# Patient Record
Sex: FEMALE | Race: WHITE | NOT HISPANIC OR LATINO | URBAN - METROPOLITAN AREA
[De-identification: names, ages, dates, MRNs, and addresses within clinical notes are randomized per-mention and may not be internally consistent; named-entity substitution may affect disease eponyms.]

---

## 2022-09-16 ENCOUNTER — MEDICAL CORRESPONDENCE (OUTPATIENT)
Dept: HEALTH INFORMATION MANAGEMENT | Facility: CLINIC | Age: 77
End: 2022-09-16

## 2022-09-23 ENCOUNTER — TRANSCRIBE ORDERS (OUTPATIENT)
Dept: OTHER | Age: 77
End: 2022-09-23

## 2022-09-23 DIAGNOSIS — R10.2 VULVAR PAIN: Primary | ICD-10-CM

## 2022-09-29 ENCOUNTER — TELEPHONE (OUTPATIENT)
Dept: OBGYN | Facility: CLINIC | Age: 77
End: 2022-09-29

## 2022-09-29 NOTE — TELEPHONE ENCOUNTER
Received referral for vulvar pain from [facility].    Tried to reach Wen, but received voicemail.  Left message to call back.    Time saved at 200 pm on 10/5.    Records in  triage

## 2022-10-04 ENCOUNTER — OFFICE VISIT (OUTPATIENT)
Dept: OBGYN | Facility: CLINIC | Age: 77
End: 2022-10-04
Attending: OBSTETRICS & GYNECOLOGY
Payer: COMMERCIAL

## 2022-10-04 VITALS — WEIGHT: 140 LBS

## 2022-10-04 DIAGNOSIS — R10.2 VULVAR PAIN: ICD-10-CM

## 2022-10-04 DIAGNOSIS — R10.2 PELVIC PAIN IN FEMALE: Primary | ICD-10-CM

## 2022-10-04 PROCEDURE — 88305 TISSUE EXAM BY PATHOLOGIST: CPT | Mod: 26 | Performed by: DERMATOLOGY

## 2022-10-04 PROCEDURE — 56606 BIOPSY OF VULVA/PERINEUM: CPT | Mod: GC | Performed by: STUDENT IN AN ORGANIZED HEALTH CARE EDUCATION/TRAINING PROGRAM

## 2022-10-04 PROCEDURE — 56606 BIOPSY OF VULVA/PERINEUM: CPT | Performed by: STUDENT IN AN ORGANIZED HEALTH CARE EDUCATION/TRAINING PROGRAM

## 2022-10-04 PROCEDURE — 56605 BIOPSY OF VULVA/PERINEUM: CPT | Mod: GC | Performed by: STUDENT IN AN ORGANIZED HEALTH CARE EDUCATION/TRAINING PROGRAM

## 2022-10-04 PROCEDURE — 56605 BIOPSY OF VULVA/PERINEUM: CPT | Performed by: STUDENT IN AN ORGANIZED HEALTH CARE EDUCATION/TRAINING PROGRAM

## 2022-10-04 PROCEDURE — G0463 HOSPITAL OUTPT CLINIC VISIT: HCPCS

## 2022-10-04 PROCEDURE — 99203 OFFICE O/P NEW LOW 30 MIN: CPT | Mod: 25 | Performed by: STUDENT IN AN ORGANIZED HEALTH CARE EDUCATION/TRAINING PROGRAM

## 2022-10-04 PROCEDURE — 88305 TISSUE EXAM BY PATHOLOGIST: CPT | Mod: TC | Performed by: STUDENT IN AN ORGANIZED HEALTH CARE EDUCATION/TRAINING PROGRAM

## 2022-10-04 RX ORDER — CYANOCOBALAMIN 1000 UG/ML
1000 INJECTION, SOLUTION INTRAMUSCULAR; SUBCUTANEOUS
COMMUNITY
Start: 2022-09-26 | End: 2023-10-21

## 2022-10-04 RX ORDER — PREGABALIN 50 MG/1
CAPSULE ORAL
COMMUNITY
Start: 2022-09-06

## 2022-10-04 RX ORDER — CYCLOSPORINE 0.5 MG/ML
1 EMULSION OPHTHALMIC
COMMUNITY

## 2022-10-04 RX ORDER — ASPIRIN 325 MG
325 TABLET ORAL
COMMUNITY
Start: 2021-09-30

## 2022-10-04 RX ORDER — HYPROMELLOSE 0 G/G
1-2 GEL OPHTHALMIC
COMMUNITY

## 2022-10-04 RX ORDER — LEVOTHYROXINE SODIUM 125 UG/1
125 TABLET ORAL
COMMUNITY
Start: 2022-08-05 | End: 2023-08-05

## 2022-10-04 RX ORDER — ARIPIPRAZOLE 5 MG/1
7.5 TABLET ORAL
COMMUNITY
Start: 2022-09-19 | End: 2022-12-18

## 2022-10-04 RX ORDER — FLUOCINOLONE ACETONIDE 0.11 MG/ML
OIL TOPICAL
COMMUNITY
Start: 2022-05-17

## 2022-10-04 RX ORDER — CLOBETASOL PROPIONATE 0.5 MG/G
CREAM TOPICAL
COMMUNITY
Start: 2022-08-25

## 2022-10-04 NOTE — PROGRESS NOTES
"[facility]  Gynecology Visit   10/04/2022    Chief Complaint: Vulvar pain    History of Present Illness:  Wen Styles is a 77 year old  who presents for consult regarding pelvic and vulvovaginal pain. She expresses frustration that she has seen 6 prior physicians about this without improvement.    Reports that the pelvic pain began suddenly and felt like a \"stump exploded inside\" of her and like she broke her pelvic bone approximately 8 months ago. She reports that the pain has occurred intermittently and lasting 5 days during the last episode and 2 days during the previous episode before that. Pain is worse when sitting, feels like pressure at though she is \"sitting on a brick\". and that it feels like someone is \"reaching up and pulling her guts out from inside of her\". She reports that applying a heating pack helped. She reports that it was more painful during those episodes than a broken bone and made it very difficult for her to walk at all. She additionally reports right sided abdominal pain that feels like a bad gas pain and bloating occassionally. Denies dysuria. Endorse pain with bowel movements at times. Denies vaginal bleeding, rectal bleeding. Reports occasional vaginal odor which resolves spontaneously.     She additional reports vulvar pain intermittently as well as severe vulvar pruritis. She has not been using any different topical products. She has not been applying anything to the vulva currently. She reports that she used clobetasol cream for 4 weeks daily and then every other day for 4 more weeks without improvement for two courses now.     Gynecologic History:  - Menopause: Reached at 38-40. No PMB. No HRT. Reports long history of painful periods prior to menopause.   - Pap Smears: Uncertain of last   - Sexual Activity: not currently sexually active, has not been sexually active since the 1970s  - Sexual Concerns: denies any concerns, no dyspareunia when last sexually active   - Hx " STIs: Denies    Obstetric History: , prolonged infertility     ROS:  A 10 point review of systems was conducted and negative except as noted in HPI    Current Medications:   Current Outpatient Medications:      ARIPiprazole (ABILIFY) 5 MG tablet, Take 7.5 mg by mouth, Disp: , Rfl:      aspirin (ASA) 325 MG tablet, Take 325 mg by mouth, Disp: , Rfl:      calcium citrate-vitamin D (CITRACAL) 315-250 MG-UNIT TABS per tablet, 2 tablets three times a day.  OTC, Disp: , Rfl:      cholecalciferol 125 MCG (5000 UT) CAPS, Take 5,000 Units by mouth, Disp: , Rfl:      clobetasol (TEMOVATE) 0.05 % external cream, Apply to affected area once a day for 1 month then twice a week for 2 months., Disp: , Rfl:      cyanocobalamin (CYANOCOBALAMIN) 1000 MCG/ML injection, Inject 1,000 mcg into the muscle every 30 days, Disp: , Rfl:      fluocinolone acetonide (DERMA SMOOTHE/FS BODY) 0.01 % external oil, , Disp: , Rfl:      levothyroxine (SYNTHROID) 125 MCG tablet, Take 125 mcg by mouth, Disp: , Rfl:      pregabalin (LYRICA) 50 MG capsule, TAKE 1 CAPSULE BY MOUTH 3 TIMES DAILY IN THE MORNING, NOON AND BEFORE BEDTIME, Disp: , Rfl:      cycloSPORINE (RESTASIS) 0.05 % ophthalmic emulsion, Apply 1 drop to eye, Disp: , Rfl:      hypromellose (GENTEAL SEVERE) 0.3 % GEL ophthalmic gel, Apply 1-2 drops to eye, Disp: , Rfl:      magnesium sulfate 500 mg/mL SOLN, Take 250 mg by mouth, Disp: , Rfl:     Allergies:      Allergies   Allergen Reactions     Iohexol Hives     Pt developed one hive after contrast CT scan.  Symptoms subsided within 10 minutes.  Dr Carranza notified and pt allowed to leave after 30 minutes with instructions to go straight home and go to ETC if breathing problems develop.  Pt also instructed to take benadryl at home if needed.     No Clinical Screening - See Comments Hives, Itching, Rash, Shortness Of Breath and Anaphylaxis     Grass, weeds  Ate a nectarine at home and a short time later had anaphylaxis type symptoms  "responding to emergency treatment.       Nulytely Amaral Hives     Facial swelling, hives, nausea     Cherry Itching     Hydroxychloroquine Other (See Comments)     Retinal Toxicity     Morphine Nausea and Vomiting     Pramipexole Other (See Comments)     Severe drop attacks     Ropinirole Nausea and Vomiting     Zoledronic Acid Other (See Comments)     Bone pain     Adhesive Tape Other (See Comments)     Hard to get tape off, blood blister, itching, Skin tears and bruising     Past Medical History:  Pernicious anemia  Hypothyroidism  Restless leg syndrome  Myofascial pain syndrome  Hyperparathyroidism  Vitamin D deficiency  Obstructive sleep apnea  Irribtable bowel syndrome  Chronic back pain  Osteoarthritis  Osteoprosis  DAYO  Depression  Psoriasis  Ischemic stroke  Inflammatory arthritis     Past Surgical History:  Open gastric bypass  Multiple orthopedic procedures     Social History:  Lives in [place] with her dog   EtOH: 1 glass of wine nightly   Tobacco: Denies use, former smoker  Drugs: Denies illicit drug use  Abuse: Denies current concerns for physical, sexual, mental, emotional, or verbal abuse.    Family History:  Bother- \"bone cancer\"  Brother- \"brain cancer\"  Brother- \"pituitary gland cancer\"  Maternal grandmother- ovarian or uterine cancer around menopause   Twelve paternal uncles/aunts  of cancer (unspecified types)  Maternal uncle-  of cancer (unspecififed type)    Denies family history of breast or colorectal cancer.     Exam:  Wt 63.5 kg (140 lb)     General:  Alert, no distress   HEENT:  Normocephalic, without obvious abnormality   Pulmonary:  Non-labored breathing on room air   Cardiovascular:  Regular rate   Abdomen:  Soft, mildly tender in right upper quadrant, non-distended   Pelvic: Vulva: Normal vulvar architecture. Small sebaceous cyst on the left vulvar biopsied. Small area of lichenoid changes at the posterior fourchette at 6 o'clock biopsied.    Vagina: Mildly atrophic, " physiologic appearing discharge  Cervix: nulliparous, smooth, no visible lesions, no cervical motion tenderness   Extremities:  Normal       Assessment/Plan:  77 year old  with pelvic and vulvar pain and vulvar pruritis     # Pelvic pain   -  No reproducible pain or tenderness on abdominal exam or with palpation of the pubic bone. Will obtain pelvic MRI to assess for possible etiology of pain.     # Possible vulvar dermatosis   - Vulvar biopsy x2 obtained today. Will await pathology results and arrange follow up based on results. Overall appears to have had good response to previous course of clobetasol treatments based on review of prior notes which suggest more extensive evidence of vulvar dermatosis. Plan to observe without ongoing topical treatment at this time but recommend repeat exam in 3 months or sooner is symptoms worsen.     Discussed with Dr. Shine Grande MD  Ob/Gyn Resident, PGY-4  10/4/2022

## 2022-10-04 NOTE — LETTER
"10/4/2022       RE: Wen Styles  [address]  [facility] [id number]     Dear Colleague,    Thank you for referring your patient, Wen Styles, to the [facility] [place] at [facility]. Please see a copy of my visit note below.    [facility]  Gynecology Visit   10/04/2022    Chief Complaint: Vulvar pain    History of Present Illness:  Wen Styles is a 77 year old  who presents for consult regarding pelvic and vulvovaginal pain. She expresses frustration that she has seen 6 prior physicians about this without improvement.    Reports that the pelvic pain began suddenly and felt like a \"stump exploded inside\" of her and like she broke her pelvic bone approximately 8 months ago. She reports that the pain has occurred intermittently and lasting 5 days during the last episode and 2 days during the previous episode before that. Pain is worse when sitting, feels like pressure at though she is \"sitting on a brick\". and that it feels like someone is \"reaching up and pulling her guts out from inside of her\". She reports that applying a heating pack helped. She reports that it was more painful during those episodes than a broken bone and made it very difficult for her to walk at all. She additionally reports right sided abdominal pain that feels like a bad gas pain and bloating occassionally. Denies dysuria. Endorse pain with bowel movements at times. Denies vaginal bleeding, rectal bleeding. Reports occasional vaginal odor which resolves spontaneously.     She additional reports vulvar pain intermittently as well as severe vulvar pruritis. She has not been using any different topical products. She has not been applying anything to the vulva currently. She reports that she used clobetasol cream for 4 weeks daily and then every other day for 4 more weeks without improvement for two courses now.     Gynecologic History:  - Menopause: " Reached at [age]. No PMB. No HRT. Reports long history of painful periods prior to menopause.   - Pap Smears: Uncertain of last   - Sexual Activity: not currently sexually active, has not been sexually active since the 1970s  - Sexual Concerns: denies any concerns, no dyspareunia when last sexually active   - Hx STIs: Denies    Obstetric History: , prolonged infertility     ROS:  A 10 point review of systems was conducted and negative except as noted in HPI    Current Medications:   Current Outpatient Medications:      ARIPiprazole (ABILIFY) 5 MG tablet, Take 7.5 mg by mouth, Disp: , Rfl:      aspirin (ASA) 325 MG tablet, Take 325 mg by mouth, Disp: , Rfl:      calcium citrate-vitamin D (CITRACAL) 315-250 MG-UNIT TABS per tablet, 2 tablets three times a day.  OTC, Disp: , Rfl:      cholecalciferol 125 MCG (5000 UT) CAPS, Take 5,000 Units by mouth, Disp: , Rfl:      clobetasol (TEMOVATE) 0.05 % external cream, Apply to affected area once a day for 1 month then twice a week for 2 months., Disp: , Rfl:      cyanocobalamin (CYANOCOBALAMIN) 1000 MCG/ML injection, Inject 1,000 mcg into the muscle every 30 days, Disp: , Rfl:      fluocinolone acetonide (DERMA SMOOTHE/FS BODY) 0.01 % external oil, , Disp: , Rfl:      levothyroxine (SYNTHROID) 125 MCG tablet, Take 125 mcg by mouth, Disp: , Rfl:      pregabalin (LYRICA) 50 MG capsule, TAKE 1 CAPSULE BY MOUTH 3 TIMES DAILY IN THE MORNING, NOON AND BEFORE BEDTIME, Disp: , Rfl:      cycloSPORINE (RESTASIS) 0.05 % ophthalmic emulsion, Apply 1 drop to eye, Disp: , Rfl:      hypromellose (GENTEAL SEVERE) 0.3 % GEL ophthalmic gel, Apply 1-2 drops to eye, Disp: , Rfl:      magnesium sulfate 500 mg/mL SOLN, Take 250 mg by mouth, Disp: , Rfl:     Allergies:      Allergies   Allergen Reactions     Iohexol Hives     Pt developed one hive after contrast CT scan.  Symptoms subsided within 10 minutes.  Dr Carranza notified and pt allowed to leave after 30 minutes with instructions to go  "straight home and go to ETC if breathing problems develop.  Pt also instructed to take benadryl at home if needed.     No Clinical Screening - See Comments Hives, Itching, Rash, Shortness Of Breath and Anaphylaxis     Grass, weeds  Ate a nectarine at home and a short time later had anaphylaxis type symptoms responding to emergency treatment.       Nulytely Amaral Hives     Facial swelling, hives, nausea     Cherry Itching     Hydroxychloroquine Other (See Comments)     Retinal Toxicity     Morphine Nausea and Vomiting     Pramipexole Other (See Comments)     Severe drop attacks     Ropinirole Nausea and Vomiting     Zoledronic Acid Other (See Comments)     Bone pain     Adhesive Tape Other (See Comments)     Hard to get tape off, blood blister, itching, Skin tears and bruising     Past Medical History:  Pernicious anemia  Hypothyroidism  Restless leg syndrome  Myofascial pain syndrome  Hyperparathyroidism  Vitamin D deficiency  Obstructive sleep apnea  Irribtable bowel syndrome  Chronic back pain  Osteoarthritis  Osteoprosis  DAYO  Depression  Psoriasis  Ischemic stroke  Inflammatory arthritis     Past Surgical History:  Open gastric bypass  Multiple orthopedic procedures     Social History:  Lives in [place] with her dog   EtOH: 1 glass of wine nightly   Tobacco: Denies use, former smoker  Drugs: Denies illicit drug use  Abuse: Denies current concerns for physical, sexual, mental, emotional, or verbal abuse.    Family History:  Bother- \"bone cancer\"  Brother- \"brain cancer\"  Brother- \"pituitary gland cancer\"  Maternal grandmother- ovarian or uterine cancer around menopause   Twelve paternal uncles/aunts  of cancer (unspecified types)  Maternal uncle-  of cancer (unspecififed type)    Denies family history of breast or colorectal cancer.     Exam:  Wt 63.5 kg (140 lb)     General:  Alert, no distress   HEENT:  Normocephalic, without obvious abnormality   Pulmonary:  Non-labored breathing on room air "   Cardiovascular:  Regular rate   Abdomen:  Soft, mildly tender in right upper quadrant, non-distended   Pelvic: Vulva: Normal vulvar architecture. Small sebaceous cyst on the left vulvar biopsied. Small area of lichenoid changes at the posterior fourchette at 6 o'clock biopsied.    Vagina: Mildly atrophic, physiologic appearing discharge  Cervix: nulliparous, smooth, no visible lesions, no cervical motion tenderness   Extremities:  Normal       Assessment/Plan:  77 year old  with pelvic and vulvar pain and vulvar pruritis     # Pelvic pain   -  No reproducible pain or tenderness on abdominal exam or with palpation of the pubic bone. Will obtain pelvic MRI to assess for possible etiology of pain.     # Possible vulvar dermatosis   - Vulvar biopsy x2 obtained today. Will await pathology results and arrange follow up based on results. Overall appears to have had good response to previous course of clobetasol treatments based on review of prior notes which suggest more extensive evidence of vulvar dermatosis. Plan to observe without ongoing topical treatment at this time but recommend repeat exam in 3 months or sooner is symptoms worsen.     Discussed with Dr. Shine Grande MD  Ob/Gyn Resident, PGY-4  10/4/2022

## 2022-10-04 NOTE — PROCEDURES
"PROCEDURE NOTE: VULVAR BIOPSY      10/4/2022     PATIENT INFORMATION:   Wen Styles   MRN [id number]   1945     PREOPERATIVE DIAGNOSIS: Vulvar pain, vulvar pruritis      POSTOPERATIVE DIAGNOSIS: Same     PROCEDURE PERFORMED: Vulvar biopsy     ANESTHESIA: 5 mL 1% lidocaine plain      CONSENT: Her questions were answered.  She was informed about the risks, benefits and alternatives to vulvar biopsy.  She verbalized understanding of the following risks:  Infection, bleeding, pain with procedure and/or the need for future procedures.  Written informed consent was obtained and scanned into the medical record.  Patient received and verbalized understanding of discharge instrcutions.     TIMEOUT: \"Pause for the Cause\"  Preprocedure verification is complete - patient verified and consents confirmed, procedure sites are identified and marked.  Timeout was called before the start of the procedure, through verbal and active participation of team members, the patient's name,  and procedure to be performed were verified.                     FINDINGS: Normal vulvar architecture. Several small, minimally erythematous lesions on the upper vulva consistent with pseudofolliculitis barbae. Area of lichenoid changes and tenderness at 0600 at the posterior fourchette biopsied and left vulvar area with approximately 5 mm palpable nodule consistent with sebaceous cyst biopsied.      PROCEDURE DETAILS:   The patient was placed in the dorsal lithotomy position. A 2 cm area at the posterior fourchette was swabbed with betadine and injected with 1% lidocaine plain. A 3 mm punch biopsy was performed. Hemostasis achieved with pressure and silver nitrate. This process was repeated at on the left vulva using a 4 mm punch biopsy.      EBL: <5 mL  Complications: none noted  Specimen(s): vulvar biopsy to pathology     Patient tolerated the procedure well.     PLAN:   -Recommendations for follow up and/or further treatment pending " pathology results  -Post biopsy instructions reviewed with patient  -Will review pathology results when available    Dr. Riojas was present for the procedure     Juanito Grande MD  Ob/Gyn Resident, PGY-4  10/04/2022 5:45 PM

## 2022-10-10 LAB
PATH REPORT.COMMENTS IMP SPEC: NORMAL
PATH REPORT.FINAL DX SPEC: NORMAL
PATH REPORT.GROSS SPEC: NORMAL
PATH REPORT.MICROSCOPIC SPEC OTHER STN: NORMAL
PATH REPORT.RELEVANT HX SPEC: NORMAL
PHOTO IMAGE: NORMAL

## 2022-10-18 ENCOUNTER — TELEPHONE (OUTPATIENT)
Dept: OBGYN | Facility: CLINIC | Age: 77
End: 2022-10-18

## 2022-10-18 ENCOUNTER — CARE COORDINATION (OUTPATIENT)
Dept: OBGYN | Facility: CLINIC | Age: 77
End: 2022-10-18

## 2022-10-18 DIAGNOSIS — N90.4 LICHEN SCLEROSUS OF VULVA: Primary | ICD-10-CM

## 2022-10-18 RX ORDER — HALOBETASOL PROPIONATE 0.05 %
OINTMENT (GRAM) TOPICAL
Qty: 50 G | Refills: 1 | Status: SHIPPED | OUTPATIENT
Start: 2022-10-18

## 2022-10-18 NOTE — TELEPHONE ENCOUNTER
"I spoke with Wen for 30 minutes regarding vulvar biopsy results, clobetasol, pelvic pain, MRI and transportation issues, and incontinence.     I informed her that the vulvar biopsy was consistent with lichen sclerosus, and that Dr. Grande's suggestion is to use clobetasol cream and to follow up in 3 months.     Wen stated that she tried clobetasol daily for two months with no improvement, both on her vulva and on the psoriasis on her scalp (prescribed by her dermatologist).  She now has two tubes of clobetasol at home and is not interested in trying it again, since it has not worked for her in the past.      Wen also expressed pain and incontinence episodes.  She described her pain: \"Like someone has reached up into my vagina and is just pulling out my guts.  It knocks me out so I can't walk for 4-5 days.\"  She has had multiple incontinence episodes where she will sit down for awhile, then stand up, and her bladder completely empties until she is soaking wet.      She is interested in doing the MRI that Dr. Grande ordered, but is unable to be transported here to get it.  She said that if she can have the MRI locally in [place], she will do that, but she wants in referred to that location.     Wen expressed frustration with how long this is taking, how many holes there have been in communication, and would like follow-through with the MRI and an alternative lichen sclerosus treatment.   "

## 2022-10-18 NOTE — TELEPHONE ENCOUNTER
Patient called and wanted to have someone call with with her pathology results from 10-4-22 and patient stated she is still in pain as well. Dr. Grande did the biopsy and patient stated she has not been called and is worried.    Let the patient know that I will have a doctor call her this afternoon to go over the pathology results with her.    Patient verbalized understanding.

## 2022-10-18 NOTE — TELEPHONE ENCOUNTER
Spoke with Dr. Roman regarding the followin. Patient's request for something other than clobetasol  2. Patient's request to have MRI done in [place]  3. Incontinence episodes    Called clinic where patient's PA, Sara Mendes, is located.  Faxed MRI orders to Sara Mendes, per request at [phone].  Clinic receptionist wrote message to PA, instructing him to watch for MRI orders.     Per Dr. Roman, patient can be offered alternative steroid cream to try, and patient should seek visit with urogynecologist regarding incontinence.

## 2022-10-18 NOTE — TELEPHONE ENCOUNTER
Spoke with Wen regardin. MRI orders: Sara Mendes now has MRI orders (faxed over this afternoon).  She will call the [facility] to schedule pelvic MRI. We will watch for MRI results.     2. Clobetasol: Wants alternative steroid cream sent to [facility] in [place]. [address] [address]    States when she urinates, the lesions burn so bad it feels like acid.  She has been using cold compresses after urinating.     3. Incontinence episodes: She will speak with Sara Mendes regarding getting a urogynecologist referral.

## 2022-10-19 ENCOUNTER — TELEPHONE (OUTPATIENT)
Dept: OBGYN | Facility: CLINIC | Age: 77
End: 2022-10-19

## 2022-10-19 NOTE — TELEPHONE ENCOUNTER
Received a call from [facility] regarding this patient. The nurse just wanted to clarify who else the patient needed to see.     Let the nurse know that the patient needs a UroGyn referral to help with her incontinence that she is experiencing. The patient stated that it is too far for her to come to our clinic due to she lives in [place]. I ask the nurse if she could help the patient find one closer to her.    The nurse from [facility] stated she will help facilitate the referral.

## 2022-10-19 NOTE — TELEPHONE ENCOUNTER
Called patient and helped her to schedule an appointment with Dr. Reese on 12-19-22 @ 11am.    Gave patient address [address] [place] [address].     Patient verbalized understanding and all questions were answered.

## 2022-11-04 ENCOUNTER — TELEPHONE (OUTPATIENT)
Dept: OBGYN | Facility: CLINIC | Age: 77
End: 2022-11-04

## 2022-11-04 NOTE — TELEPHONE ENCOUNTER
Called. Is needing clarity on appointment schedule.     Has appointment with Mary Ann on 12/19 at 11am.     Also needs pelvic MRI, which has been ordered but not scheduled yet through [facility].     Original plan a month or two ago was for patient to receive MRI in [place] through [facility].  Order was faxed there, and patient was seen by PCP Seferino Mendes.  It appears patient has radiology appointment with [facility] 11/15.     Attempted to call patient back, went to voicemail.

## 2022-11-07 ENCOUNTER — TELEPHONE (OUTPATIENT)
Dept: OBGYN | Facility: CLINIC | Age: 77
End: 2022-11-07

## 2022-11-07 DIAGNOSIS — Z75.3 INABILITY TO ACCESS HEALTH CARE DUE TO TRANSPORTATION INSECURITY: Primary | ICD-10-CM

## 2022-11-07 DIAGNOSIS — Z59.82 INABILITY TO ACCESS HEALTH CARE DUE TO TRANSPORTATION INSECURITY: Primary | ICD-10-CM

## 2022-11-07 SDOH — ECONOMIC STABILITY - TRANSPORTATION SECURITY: TRANSPORTATION INSECURITY: Z59.82

## 2022-11-07 NOTE — TELEPHONE ENCOUNTER
"Hi,     I called Wen today, please see note in chart for details. For her appointment with Dr Reese, we will set this up as a telephone appointment for now. Would you be able to connect her to a social work team to help with a ride from  [place] to the [facility]? This is her main barrier to care. If she is able to get a ride the great, if not it will be a telephone appointment.     Thanks so much,     Viri Claudio MD PGY4   Obstetrics & Gynecology   11/29/22       Received telephone call from Wen, upset that she has an appointment that \"somebody set up for me\".  \"They need to talk to me!  Nobody else knows my schedule!\"    Advised that there is a note from [facility] nurse that she spoke to Wen, and assisted in setting up the appointment with Dr Reese.  She states that this is not true, that nobody spoke with her.    Asked if she wishes to cancel the appointment, but she declined.    She will check with her insurance and .    Would also like a call to review her biopsy results from October.  She has many questions about lichen sclerosis.  She is not getting relief from clobetasol.    Routed to [facility]  "

## 2022-11-29 ENCOUNTER — TELEPHONE (OUTPATIENT)
Dept: OBGYN | Facility: CLINIC | Age: 77
End: 2022-11-29
Payer: COMMERCIAL

## 2022-11-29 NOTE — TELEPHONE ENCOUNTER
Called patient to discuss message. She is very frustrated with her cares at [facility] clinic and that she hasn't heard back about her biopsy results of lichen sclerosus. Expressed concerns about  staff, and inability to get ride. Frustrated that narcotics are not available, has seen pain clinic before and was not prescribed narcotics.     Discussed dx lichen sclerosus from biopsy. Currently continues to use clobetasol every other day but didn't feel like it helped. Had MRI recently that showed arthritis of her pubic bone.     Has upcoming appointment with Dr. Reese on 12/19/22, will make this a telephone visit until ride can be arranged. Will contact social work team to assist in ride options.       Viri Claudio MD PGY4  Obstetrics & Gynecology  11/29/22

## 2022-12-06 ENCOUNTER — TELEPHONE (OUTPATIENT)
Dept: UROLOGY | Facility: CLINIC | Age: 77
End: 2022-12-06

## 2022-12-06 ENCOUNTER — TELEPHONE (OUTPATIENT)
Dept: OBGYN | Facility: CLINIC | Age: 77
End: 2022-12-06

## 2022-12-06 ENCOUNTER — PATIENT OUTREACH (OUTPATIENT)
Dept: CARE COORDINATION | Facility: CLINIC | Age: 77
End: 2022-12-06

## 2022-12-06 NOTE — TELEPHONE ENCOUNTER
Informed  that visit can be done virtually, per Dr. Reese.      Called and left VM with patient, stating that visit can be done virtually, and to call back with questions.

## 2022-12-06 NOTE — TELEPHONE ENCOUNTER
Imaging called with pt on the line. Pt on wrong line, pt is to connect with [facility] about theirappt. Pt is stating they do not have means for a VV. Writer messaging Ginger Clement RN in regards to this. Pt may need a ride set up for an in person appt instead as previously mentioned per pt.    December 6, 2022  Ginger Clement, RN     HR    10:31 AM  Note  Informed  that visit can be done virtually, per Dr. Reese.       Called and left VM with patient, stating that visit can be done virtually, and to call back with questions.

## 2022-12-07 NOTE — TELEPHONE ENCOUNTER
"Called patient regarding 12/19 appointment with Dr. Reese.     Patient is very upset that \"nobody is doing anything to help me.\" Lives in [place] and social work has been working with patient to arrange a ride.  The only rides available were very expensive.     Patient stated she is entirely unable to come to appointment on 12/19 and requested it be cancelled.  When offered virtual appointment, patient stated, \"She can't diagnose anything over the phone! I'm worth more than that!\"  Appointment cancelled per request.     Patient expressed frustration over [facility] visit in October.  She believed she was coming for an MRI, but had biopsies done instead.  She stated that the doctors are not asking her what she wants, only ordering procedures without consulting her. \"I'm just the patient, I don't matter.\"     Reminded patient that Dr. Viri Claudio spoke with her 11/29.  Patient did not recall phone call at all, denied ever receiving a phone call from [facility].     RN clarified with patient that she wants appointment cancelled and to pursue care closer to home.  Patient expressed understanding that appointment was cancelled.       "

## 2022-12-13 NOTE — PROGRESS NOTES
Clinic Care Coordination Contact  Care Coordination Clinician Chart Review    Situation: Patient chart reviewed by care coordinator.    Background: Clinic Care Coordination Referral placed by [facility].    Assessment: Upon chart review, patient is not a candidate for Primary Care Clinic Care Coordination enrollment due to reason stated below:  no resources available for transportation from [place] (Patient is on Medicare plan and indicates not informal supports.)    Plan/Recommendations: Clinic Care Coordination Referral/order cancelled. RN/SW CC will perform no further monitoring/outreaches at this time and will remain available as needed. If new needs arise, a new Care Coordination Referral may be placed.      JÚNIOR Salazar ([place])  , Care Coordination  [facility]  [facility]  [facility]  [email or web address]   Office: [phone]